# Patient Record
Sex: MALE | Race: AMERICAN INDIAN OR ALASKA NATIVE
[De-identification: names, ages, dates, MRNs, and addresses within clinical notes are randomized per-mention and may not be internally consistent; named-entity substitution may affect disease eponyms.]

---

## 2020-03-12 ENCOUNTER — HOSPITAL ENCOUNTER (EMERGENCY)
Dept: HOSPITAL 56 - MW.ED | Age: 40
Discharge: HOME | End: 2020-03-12
Payer: COMMERCIAL
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
## 2020-03-12 DIAGNOSIS — S01.81XA: ICD-10-CM

## 2020-03-12 DIAGNOSIS — S02.2XXA: Primary | ICD-10-CM

## 2020-03-12 PROCEDURE — 12011 RPR F/E/E/N/L/M 2.5 CM/<: CPT

## 2020-03-12 PROCEDURE — 70486 CT MAXILLOFACIAL W/O DYE: CPT

## 2020-03-12 PROCEDURE — 70450 CT HEAD/BRAIN W/O DYE: CPT

## 2020-03-12 PROCEDURE — 99283 EMERGENCY DEPT VISIT LOW MDM: CPT

## 2020-03-12 NOTE — CT
-- DO NOT REPLY / DO NOT REPLY ALL --  -- Message is from the [facility]fusion [facility]--    Provider paged via [address] - The below message was copied and pasted from a [employer] page:    patient is calling back she spoke with dr Anayeli Castro about the medication being prescribed to her for her yeast infection she spoke with curry and was told that the medication can nbe filled because the prescription was not written by a dr. please call back to to discuss CT facial bones

 

Technique: Multiple axial sections through the facial bones were 

obtained.  Reconstructed coronal and sagittal images were obtained.

 

Findings: Soft tissue swelling and minimal modest soft tissue air seen

 within the left frontal scalp.  Soft tissue swelling is also noted 

within the bridge of the nose.

 

Slightly displaced and comminuted nasal bone fracture is seen.

 

Right and left globes are symmetric.  No retrobulbar abnormality is 

seen.  No additional facial bone fracture is seen.

 

Impression:

1.  Slightly comminuted and displaced nasal bone fracture.

2.  Soft tissue swelling as noted above.

 

Diagnostic code #3

 

This report was dictated in MDT

## 2020-03-12 NOTE — CT
Head CT

 

Technique: Multiple axial sections through the brain were obtained.  

Intravenous contrast was not utilized.

 

Comparison: No prior intracranial imaging.

 

Findings: Ventricles along with basal cisterns and sulci over the 

convexities appear within normal limits for the patient's age.  No 

abnormal parenchymal densities are seen.  No evidence of intracranial 

hemorrhage.  No midline shift or mass-effect is seen.

 

Nasal bone fracture is seen.  Soft tissue swelling is seen at the 

bridge of the nose and within the left frontal scalp.  Small amount of

 soft tissue air is seen.

 

No acute calvarial finding is seen.  Visualized mastoid sinuses and 

paranasal sinuses show nothing acute.

 

Impression:

1.  Nasal bone fracture.  Soft tissue swelling as noted above.

2.  No acute intracranial abnormality is appreciated.

 

Diagnostic code #2

 

This report was dictated in MDT

## 2020-03-12 NOTE — EDM.PDOC
ED HPI GENERAL MEDICAL PROBLEM





- General


Chief Complaint: Laceration


Stated Complaint: EYE INJURY LEFT


Time Seen by Provider: 03/12/20 17:27


Source of Information: Reports: Patient


History Limitations: Reports: No Limitations





- History of Present Illness


INITIAL COMMENTS - FREE TEXT/NARRATIVE: 





HISTORY AND PHYSICAL:





History of present illness:


Patient is a 40-year-old male presents to the ED with complaint of head injury. 

Patient states he was putting chain on a trailer when the chain came back and 

hit him in the face. He states he did lose consciousness for a moment. He 

states he was dazed afterwards. He denies visual changes, nausea, or vomiting. 

He is complaining of pain in his nose. Patient states last tetanus shot was 3-4 

years ago. 





Review of systems: 


As per history of present illness and below otherwise all systems reviewed and 

negative.





Past medical history: 


As per history of present illness and as reviewed below otherwise 

noncontributory.





Surgical history: 


As per history of present illness and as reviewed below otherwise 

noncontributory.





Social history: 


No reported history of drug or alcohol abuse.





Family history: 


As per history of present illness and as reviewed below otherwise 

noncontributory.





Physical exam:


General: Patient sitting comfortably in no acute distress and nontoxic appearing


HEENT: There is a 1 cm laceration just above the left eyebrow with moderate 

swelling. There is a superficial 1 cm abrasion to the bridge of the nose with 

swelling to the left side of the nose and tender to palpation. There is no 

septal hematoma or septal deviation and nares are patent bilaterally.  

normocephalic, pupils reactive, negative for conjunctival pallor or scleral 

icterus, mucous membranes moist, throat clear, neck supple, nontender, trachea 

midline. No meningeal signs. 


Lungs: Clear to auscultation, breath sounds equal bilaterally, chest nontender.


Heart: S1S2, regular, negative for clicks, rubs, or overt murmur.


Abdomen: Soft, nondistended, nontender. Negative for masses or 

hepatosplenomegaly. Negative for costovertebral tenderness. No rigidity, rebound

, guarding.


Pelvis: Stable nontender.


Genitourinary: Deferred.


Rectal: Deferred.


Extremities: Atraumatic, negative for cords or calf pain. Neurovascular 

unremarkable.


Neuro: Awake, alert, oriented. Cranial nerves II through XII unremarkable. 

Cerebellum unremarkable. Motor and sensory unremarkable throughout. Exam 

nonfocal.





Notes: 





Diagnostics:


Head CT w/o contrast, maxillofacial CT w/o contrast 





Therapeutics:


[]





Prescriptions:








Impression: 


Nasal fracture, facial laceration 





Plan:


Take antibiotic as instructed


Keep the area clean and dry as discussed


Follow-up with ENT


Return to ED as needed as discussed





Definitive disposition and diagnosis as appropriate pending reevaluation and 

review of above.








  ** face


Pain Score (Numeric/FACES): 7





- Related Data


 Allergies











Allergy/AdvReac Type Severity Reaction Status Date / Time


 


No Known Allergies Allergy   Verified 03/12/20 17:22











Home Meds: 


 Home Meds





. [No Known Home Meds]  03/12/20 [History]











ED ROS GENERAL





- Review of Systems


Review Of Systems: Comprehensive ROS is negative, except as noted in HPI.





ED EXAM, SKIN/RASH


Exam: See Below (see dictation)





ED SKIN PROCEDURES





- Laceration/Wound Repair


  ** Left Head


Appearance: Superficial, Subcutaneous, Linear, Clean


Distal NVT: Neuro & Vascular Intact, No Tendon Injury


Skin Prep: Saline


Saline Irrigation (cc's): 250


Exploration/Debridement/Repair: Wound Explored, In a Bloodless Field, Explored 

to Base, No Foreign Material Found


Closed with: Dermabond


Lac/Wound length In cm: 1





Course





- Vital Signs


Last Recorded V/S: 


 Last Vital Signs











Temp  98.6 F   03/12/20 17:40


 


Pulse  67   03/12/20 17:40


 


Resp  18   03/12/20 17:40


 


BP  129/82   03/12/20 17:40


 


Pulse Ox  97   03/12/20 17:40














- Orders/Labs/Meds


Meds: 


Medications














Discontinued Medications














Generic Name Dose Route Start Last Admin





  Trade Name Boy  PRN Reason Stop Dose Admin


 


Octyl Cyanoacrylate  1 applic  03/12/20 18:18  03/12/20 18:31





  Dermabond Advance  TOP  03/12/20 18:19  1 applic





  ONETIME ONE   Administration





     





     





     





     


 


Octyl Cyanoacrylate  Confirm  03/12/20 18:18  03/12/20 18:32





  Dermabond Advance  Administered  03/12/20 18:19  Not Given





  Dose   





  1 applic   





  .ROUTE   





  .STK-MED ONE   





     





     





     





     














Departure





- Departure


Time of Disposition: 18:18


Disposition: Home, Self-Care 01


Condition: Good


Clinical Impression: 


 Laceration of face, Nasal fracture








- Discharge Information


Referrals: 


[facility] [Primary Care Provider] - 


Forms:  ED Department Discharge


Additional Instructions: 


The following information is given to patients seen in the emergency department 

who are being discharged to home. This information is to outline your options 

for follow-up care. We provide all patients seen in our emergency department 

with a follow-up referral.





The need for follow-up, as well as the timing and circumstances, are variable 

depending upon the specifics of your emergency department visit.





If you don't have a primary care physician on staff, we will provide you with a 

referral. We always advise you to contact your personal physician following an 

emergency department visit to inform them of the circumstance of the visit and 

for follow-up with them and/or the need for any referrals to a consulting 

specialist.





The emergency department will also refer you to a specialist when appropriate. 

This referral assures that you have the opportunity for follow-up care with a 

specialist. All of these measure are taken in an effort to provide you with 

optimal care, which includes your follow-up.





Under all circumstances we always encourage you to contact your private 

physician who remains a resource for coordinating your care. When calling for 

follow-up care, please make the office aware that this follow-up is from your 

recent emergency room visit. If for any reason you are refused follow-up, 

please contact the [facility] Emergency 

Department at [phone] and asked to speak to the emergency department 

charge nurse.





[facility]


Primary Care


[address] [place], [place] [id number]


Phone: [phone]


Fax: [phone]





[place] [address]


Phone: [phone]


Fax: [phone]





Take antibiotic as instructed


Keep the area clean and dry as discussed


Follow-up with ENT


Return to ED as needed as discussed

















Sepsis Event Note





- Evaluation


Sepsis Screening Result: No Definite Risk





- Focused Exam


Vital Signs: 


 Vital Signs











  Temp Pulse Resp BP Pulse Ox


 


 03/12/20 17:40  98.6 F  67  18  129/82  97


 


 03/12/20 17:22  95.9 F L  72  18  166/91 H  96











Date Exam was Performed: 03/12/20


Time Exam was Performed: 18:45